# Patient Record
Sex: FEMALE
[De-identification: names, ages, dates, MRNs, and addresses within clinical notes are randomized per-mention and may not be internally consistent; named-entity substitution may affect disease eponyms.]

---

## 2018-05-21 ENCOUNTER — HOSPITAL ENCOUNTER (EMERGENCY)
Dept: HOSPITAL 31 - C.ER | Age: 38
Discharge: HOME | End: 2018-05-21
Payer: SELF-PAY

## 2018-05-21 VITALS — RESPIRATION RATE: 14 BRPM | HEART RATE: 80 BPM | SYSTOLIC BLOOD PRESSURE: 130 MMHG | DIASTOLIC BLOOD PRESSURE: 80 MMHG

## 2018-05-21 VITALS — TEMPERATURE: 98.5 F | OXYGEN SATURATION: 99 %
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
## 2018-05-21 DIAGNOSIS — S40.811A: ICD-10-CM

## 2018-05-21 DIAGNOSIS — S09.90XA: Primary | ICD-10-CM
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
## 2018-06-14 ENCOUNTER — HOSPITAL ENCOUNTER (EMERGENCY)
Dept: HOSPITAL 31 - C.ER | Age: 38
Discharge: HOME | End: 2018-06-14
Payer: SELF-PAY

## 2018-06-14 VITALS
SYSTOLIC BLOOD PRESSURE: 117 MMHG | OXYGEN SATURATION: 100 % | TEMPERATURE: 98.6 F | HEART RATE: 72 BPM | RESPIRATION RATE: 18 BRPM | DIASTOLIC BLOOD PRESSURE: 83 MMHG

## 2018-06-14 VITALS — BODY MASS INDEX: 25 KG/M2

## 2018-06-14 DIAGNOSIS — R22.2: Primary | ICD-10-CM
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
## 2019-04-19 ENCOUNTER — HOSPITAL ENCOUNTER (EMERGENCY)
Dept: HOSPITAL 14 - H.ER | Age: 39
Discharge: HOME | End: 2019-04-19
Payer: SELF-PAY

## 2019-04-19 VITALS — DIASTOLIC BLOOD PRESSURE: 67 MMHG | SYSTOLIC BLOOD PRESSURE: 110 MMHG | HEART RATE: 84 BPM | TEMPERATURE: 98.1 F

## 2019-04-19 VITALS — OXYGEN SATURATION: 99 % | RESPIRATION RATE: 18 BRPM

## 2019-04-19 VITALS — BODY MASS INDEX: 23.3 KG/M2

## 2019-04-19 DIAGNOSIS — O03.4: Primary | ICD-10-CM

## 2019-04-19 DIAGNOSIS — R10.2: ICD-10-CM

## 2019-04-19 LAB
ALBUMIN SERPL-MCNC: 4.4 G/DL (ref 3.5–5)
ALBUMIN/GLOB SERPL: 1.1 {RATIO} (ref 1–2.1)
ALT SERPL-CCNC: 26 U/L (ref 9–52)
AST SERPL-CCNC: 28 U/L (ref 14–36)
BASOPHILS # BLD AUTO: 0.1 K/UL (ref 0–0.2)
BASOPHILS NFR BLD: 0.9 % (ref 0–2)
BUN SERPL-MCNC: 11 MG/DL (ref 7–17)
CALCIUM SERPL-MCNC: 9.3 MG/DL (ref 8.4–10.2)
EOSINOPHIL # BLD AUTO: 0.2 K/UL (ref 0–0.7)
EOSINOPHIL NFR BLD: 3.1 % (ref 0–4)
ERYTHROCYTE [DISTWIDTH] IN BLOOD BY AUTOMATED COUNT: 16.6 % (ref 11.5–14.5)
GFR NON-AFRICAN AMERICAN: > 60
HGB BLD-MCNC: 12.4 G/DL (ref 12–16)
INR PPP: 1.1
LYMPHOCYTES # BLD AUTO: 1.3 K/UL (ref 1–4.3)
LYMPHOCYTES NFR BLD AUTO: 20 % (ref 20–40)
MCH RBC QN AUTO: 30.1 PG (ref 27–31)
MCHC RBC AUTO-ENTMCNC: 32.8 G/DL (ref 33–37)
MCV RBC AUTO: 91.7 FL (ref 81–99)
MONOCYTES # BLD: 0.4 K/UL (ref 0–0.8)
MONOCYTES NFR BLD: 6.1 % (ref 0–10)
NEUTROPHILS # BLD: 4.6 K/UL (ref 1.8–7)
NEUTROPHILS NFR BLD AUTO: 69.9 % (ref 50–75)
NRBC BLD AUTO-RTO: 0 % (ref 0–0)
PLATELET # BLD: 195 K/UL (ref 130–400)
PMV BLD AUTO: 9.7 FL (ref 7.2–11.7)
PROTHROMBIN TIME: 12.1 SECONDS (ref 9.8–13.1)
RBC # BLD AUTO: 4.13 MIL/UL (ref 3.8–5.2)
WBC # BLD AUTO: 6.6 K/UL (ref 4.8–10.8)

## 2019-04-19 NOTE — US
Date of service: 



04/19/2019



PROCEDURE:  OB Pelvic Ultrasound



HISTORY:

fetal demise outpt us



COMPARISON:

None available.



FINDINGS:



UTERUS:

Single intrauterine gestational sac.



Gestational sac diameter measures 3.6 cm equivalent to 8 weeks and 6 

days of gestational age. 



Yolk sac and fetal pole are not identified within the gestational 

sac.  



Devorah-gestational hemorrhage: None.



Uterus measures 10.1 x 6.3 x 6.0 cm. No mass



CERVIX:

Measures 4.0 cm.  Long and closed. No cervical abnormality seen.



RIGHT OVARY:

Measures 3.7 x 3.8 x 3.0 cm. No mass. Normal flow. There is a 2.2 x 

2.3 x 1.9 cm corpus luteum cyst.



LEFT OVARY:

Not visualized.  



FREE FLUID:

None.



OTHER FINDINGS:

None. 



IMPRESSION:

Single intrauterine gestational sac without evidence for yolk sac or 

fetal pole suggestive of an embryonic pregnancy.

## 2019-04-19 NOTE — ED PDOC
HPI: Female  Pain


Time Seen by Provider: 19 10:22


History Per: Patient


Onset/Duration Of Symptoms: Unknown


Current Symptoms Are (Timing): Still Present


Quality Of Discomfort: Cramping


Associated Symptoms: denies: Nausea, Vomiting, Diarrhea, Urinary Symptoms


Additional Complaint(s): 





Referred by PMD, outpt US done yesterday showed fetal demise. Pt states lower 

abdominal cramping but denies vaginal bleeding. [id number] LMP 


Abnormal Vaginal Bleeding: No





Past Medical History


Vital Signs: 





                                Last Vital Signs











Temp  98.4 F   19 10:15


 


Pulse  67   19 10:15


 


Resp  18   19 10:15


 


BP  103/68   19 10:15


 


Pulse Ox  99   19 10:15














- Medical History


PMH: No Chronic Diseases





- Family History


Family History: States: Unknown Family Hx





- Immunization History


Hx Tetanus Toxoid Vaccination: Yes (3 yrs ago)


Hx Influenza Vaccination: No


Hx Pneumococcal Vaccination: No





- Home Medications


Home Medications: 


                                Ambulatory Orders











 Medication  Instructions  Recorded


 


Ibuprofen [Motrin Tab] 600 mg PO Q8 PRN #30 tab 18














- Allergies


Allergies/Adverse Reactions: 


                                    Allergies











Allergy/AdvReac Type Severity Reaction Status Date / Time


 


No Known Allergies Allergy   Verified 19 10:33














Review of Systems


Constitutional: Negative for: Fever


Gastrointestinal: Positive for: Abdominal Pain


Genitourinary Female: Negative for: Vaginal Bleeding





Physical Exam





- Physical Exam


Appears: Positive for: Non-toxic, No Acute Distress


Skin: Positive for: Normal Color, Warm, DRY


Gastrointestinal/Abdominal: Positive for: Normal Exam, Soft


Neurological/Psych: Positive for: Awake, Alert, Normal Tone





- Laboratory Results


Result Diagrams: 


                                 19 10:50





                                 19 10:50





- ECG


O2 Sat by Pulse Oximetry: 99





Medical Decision Making


Medical Decision Making: 





Discussed with Ob Dr. Peralta. Findings most likely represents incomplete Ab. 

Can be followed as outpt with expectant waiting





Disposition





- Clinical Impression


Clinical Impression: 


 Incomplete miscarriage








- Patient ED Disposition


Is Patient to be Admitted: No


Counseled Patient/Family Regarding: Studies Performed, Diagnosis, Need For 

Followup, Rx Given





- Disposition


Referrals: 


Arnoldo Brown MD [Medical Doctor] - 


Disposition: Routine/Home


Disposition Time: 12:33


Condition: FAIR


Instructions:  Miscarriage


Print Language: Malawian